# Patient Record
Sex: MALE | Race: WHITE
[De-identification: names, ages, dates, MRNs, and addresses within clinical notes are randomized per-mention and may not be internally consistent; named-entity substitution may affect disease eponyms.]

---

## 2018-04-23 ENCOUNTER — HOSPITAL ENCOUNTER (OUTPATIENT)
Dept: HOSPITAL 74 - FECT | Age: 58
Discharge: HOME | End: 2018-04-23
Payer: COMMERCIAL

## 2018-04-23 DIAGNOSIS — F33.2: Primary | ICD-10-CM

## 2018-04-23 PROCEDURE — GZB4ZZZ OTHER ELECTROCONVULSIVE THERAPY: ICD-10-PCS | Performed by: PSYCHIATRY & NEUROLOGY

## 2018-04-25 ENCOUNTER — HOSPITAL ENCOUNTER (OUTPATIENT)
Dept: HOSPITAL 74 - FECT | Age: 58
Discharge: HOME | End: 2018-04-25
Attending: PSYCHIATRY & NEUROLOGY
Payer: COMMERCIAL

## 2018-04-25 VITALS — DIASTOLIC BLOOD PRESSURE: 68 MMHG | HEART RATE: 68 BPM | SYSTOLIC BLOOD PRESSURE: 108 MMHG

## 2018-04-25 VITALS — TEMPERATURE: 97.6 F

## 2018-04-25 VITALS — BODY MASS INDEX: 29.2 KG/M2

## 2018-04-25 DIAGNOSIS — F33.2: Primary | ICD-10-CM

## 2018-04-25 PROCEDURE — GZB4ZZZ OTHER ELECTROCONVULSIVE THERAPY: ICD-10-PCS | Performed by: PSYCHIATRY & NEUROLOGY

## 2018-04-27 ENCOUNTER — HOSPITAL ENCOUNTER (OUTPATIENT)
Dept: HOSPITAL 74 - FECT | Age: 58
Discharge: HOME | End: 2018-04-27
Payer: COMMERCIAL

## 2018-04-27 DIAGNOSIS — F33.2: Primary | ICD-10-CM

## 2018-04-27 PROCEDURE — GZB4ZZZ OTHER ELECTROCONVULSIVE THERAPY: ICD-10-PCS | Performed by: PSYCHIATRY & NEUROLOGY

## 2018-04-30 ENCOUNTER — HOSPITAL ENCOUNTER (OUTPATIENT)
Dept: HOSPITAL 74 - FECT | Age: 58
Discharge: HOME | End: 2018-04-30
Attending: PSYCHIATRY & NEUROLOGY
Payer: COMMERCIAL

## 2018-04-30 VITALS — HEART RATE: 64 BPM | DIASTOLIC BLOOD PRESSURE: 64 MMHG | SYSTOLIC BLOOD PRESSURE: 110 MMHG

## 2018-04-30 VITALS — BODY MASS INDEX: 28.7 KG/M2

## 2018-04-30 VITALS — TEMPERATURE: 98 F

## 2018-04-30 DIAGNOSIS — F33.2: Primary | ICD-10-CM

## 2018-04-30 PROCEDURE — GZB4ZZZ OTHER ELECTROCONVULSIVE THERAPY: ICD-10-PCS | Performed by: PSYCHIATRY & NEUROLOGY

## 2018-05-02 ENCOUNTER — HOSPITAL ENCOUNTER (OUTPATIENT)
Dept: HOSPITAL 74 - FECT | Age: 58
Discharge: HOME | End: 2018-05-02
Attending: PSYCHIATRY & NEUROLOGY
Payer: COMMERCIAL

## 2018-05-02 VITALS — HEART RATE: 69 BPM | SYSTOLIC BLOOD PRESSURE: 100 MMHG | DIASTOLIC BLOOD PRESSURE: 61 MMHG

## 2018-05-02 VITALS — BODY MASS INDEX: 29 KG/M2

## 2018-05-02 VITALS — TEMPERATURE: 97.7 F

## 2018-05-02 DIAGNOSIS — F33.2: Primary | ICD-10-CM

## 2018-05-02 PROCEDURE — GZB4ZZZ OTHER ELECTROCONVULSIVE THERAPY: ICD-10-PCS | Performed by: PSYCHIATRY & NEUROLOGY

## 2018-05-04 ENCOUNTER — HOSPITAL ENCOUNTER (OUTPATIENT)
Dept: HOSPITAL 74 - FECT | Age: 58
Discharge: HOME | End: 2018-05-04
Attending: PSYCHIATRY & NEUROLOGY
Payer: COMMERCIAL

## 2018-05-04 VITALS — HEART RATE: 64 BPM

## 2018-05-04 VITALS — DIASTOLIC BLOOD PRESSURE: 61 MMHG | SYSTOLIC BLOOD PRESSURE: 100 MMHG

## 2018-05-04 VITALS — TEMPERATURE: 97.9 F

## 2018-05-04 VITALS — BODY MASS INDEX: 28.8 KG/M2

## 2018-05-04 DIAGNOSIS — F33.2: Primary | ICD-10-CM

## 2018-05-04 PROCEDURE — GZB4ZZZ OTHER ELECTROCONVULSIVE THERAPY: ICD-10-PCS | Performed by: PSYCHIATRY & NEUROLOGY

## 2018-05-07 ENCOUNTER — HOSPITAL ENCOUNTER (OUTPATIENT)
Dept: HOSPITAL 74 - FECT | Age: 58
Discharge: HOME | End: 2018-05-07
Attending: PSYCHIATRY & NEUROLOGY
Payer: COMMERCIAL

## 2018-05-07 VITALS — BODY MASS INDEX: 28.8 KG/M2

## 2018-05-07 VITALS — DIASTOLIC BLOOD PRESSURE: 70 MMHG | SYSTOLIC BLOOD PRESSURE: 99 MMHG | HEART RATE: 67 BPM

## 2018-05-07 VITALS — TEMPERATURE: 97.9 F

## 2018-05-07 DIAGNOSIS — F33.2: Primary | ICD-10-CM

## 2018-05-07 PROCEDURE — GZB4ZZZ OTHER ELECTROCONVULSIVE THERAPY: ICD-10-PCS | Performed by: PSYCHIATRY & NEUROLOGY

## 2018-05-09 ENCOUNTER — HOSPITAL ENCOUNTER (OUTPATIENT)
Dept: HOSPITAL 74 - FECT | Age: 58
Discharge: HOME | End: 2018-05-09
Attending: PSYCHIATRY & NEUROLOGY
Payer: COMMERCIAL

## 2018-05-09 VITALS — TEMPERATURE: 97.8 F

## 2018-05-09 VITALS — DIASTOLIC BLOOD PRESSURE: 64 MMHG | HEART RATE: 63 BPM | SYSTOLIC BLOOD PRESSURE: 110 MMHG

## 2018-05-09 VITALS — BODY MASS INDEX: 28.9 KG/M2

## 2018-05-09 DIAGNOSIS — F33.2: Primary | ICD-10-CM

## 2018-05-09 PROCEDURE — GZB4ZZZ OTHER ELECTROCONVULSIVE THERAPY: ICD-10-PCS | Performed by: PSYCHIATRY & NEUROLOGY

## 2018-05-11 ENCOUNTER — HOSPITAL ENCOUNTER (OUTPATIENT)
Dept: HOSPITAL 74 - FECT | Age: 58
Discharge: HOME | End: 2018-05-11
Attending: PSYCHIATRY & NEUROLOGY
Payer: COMMERCIAL

## 2018-05-11 VITALS — TEMPERATURE: 97.7 F

## 2018-05-11 VITALS — SYSTOLIC BLOOD PRESSURE: 114 MMHG | DIASTOLIC BLOOD PRESSURE: 68 MMHG

## 2018-05-11 VITALS — BODY MASS INDEX: 28.8 KG/M2

## 2018-05-11 VITALS — HEART RATE: 53 BPM

## 2018-05-11 DIAGNOSIS — F33.2: Primary | ICD-10-CM

## 2018-05-11 PROCEDURE — GZB4ZZZ OTHER ELECTROCONVULSIVE THERAPY: ICD-10-PCS | Performed by: PSYCHIATRY & NEUROLOGY

## 2018-05-14 ENCOUNTER — HOSPITAL ENCOUNTER (OUTPATIENT)
Dept: HOSPITAL 74 - FECT | Age: 58
Discharge: HOME | End: 2018-05-14
Attending: PSYCHIATRY & NEUROLOGY
Payer: COMMERCIAL

## 2018-05-14 VITALS — DIASTOLIC BLOOD PRESSURE: 64 MMHG | SYSTOLIC BLOOD PRESSURE: 104 MMHG

## 2018-05-14 VITALS — BODY MASS INDEX: 28.7 KG/M2

## 2018-05-14 VITALS — TEMPERATURE: 97.7 F | HEART RATE: 68 BPM

## 2018-05-14 DIAGNOSIS — F33.2: Primary | ICD-10-CM

## 2018-05-14 PROCEDURE — GZB0ZZZ ELECTROCONVULSIVE THERAPY, UNILATERAL-SINGLE SEIZURE: ICD-10-PCS | Performed by: PSYCHIATRY & NEUROLOGY

## 2018-05-16 ENCOUNTER — HOSPITAL ENCOUNTER (OUTPATIENT)
Dept: HOSPITAL 74 - FECT | Age: 58
Discharge: HOME | End: 2018-05-16
Attending: PSYCHIATRY & NEUROLOGY
Payer: COMMERCIAL

## 2018-05-16 VITALS — TEMPERATURE: 97.6 F

## 2018-05-16 VITALS — SYSTOLIC BLOOD PRESSURE: 104 MMHG | HEART RATE: 72 BPM | DIASTOLIC BLOOD PRESSURE: 67 MMHG

## 2018-05-16 VITALS — BODY MASS INDEX: 28.8 KG/M2

## 2018-05-16 DIAGNOSIS — F33.2: Primary | ICD-10-CM

## 2018-05-16 PROCEDURE — GZB4ZZZ OTHER ELECTROCONVULSIVE THERAPY: ICD-10-PCS | Performed by: PSYCHIATRY & NEUROLOGY

## 2018-05-18 ENCOUNTER — HOSPITAL ENCOUNTER (OUTPATIENT)
Dept: HOSPITAL 74 - FECT | Age: 58
Discharge: HOME | End: 2018-05-18
Attending: PSYCHIATRY & NEUROLOGY
Payer: COMMERCIAL

## 2018-05-18 VITALS — BODY MASS INDEX: 28.8 KG/M2

## 2018-05-18 VITALS — SYSTOLIC BLOOD PRESSURE: 109 MMHG | DIASTOLIC BLOOD PRESSURE: 70 MMHG | HEART RATE: 61 BPM

## 2018-05-18 VITALS — TEMPERATURE: 97.8 F

## 2018-05-18 DIAGNOSIS — F33.2: Primary | ICD-10-CM

## 2018-05-18 PROCEDURE — GZB4ZZZ OTHER ELECTROCONVULSIVE THERAPY: ICD-10-PCS | Performed by: PSYCHIATRY & NEUROLOGY

## 2018-05-21 ENCOUNTER — HOSPITAL ENCOUNTER (OUTPATIENT)
Dept: HOSPITAL 74 - FECT | Age: 58
Discharge: HOME | End: 2018-05-21
Attending: PSYCHIATRY & NEUROLOGY
Payer: COMMERCIAL

## 2018-05-21 VITALS — SYSTOLIC BLOOD PRESSURE: 119 MMHG | DIASTOLIC BLOOD PRESSURE: 74 MMHG | HEART RATE: 57 BPM

## 2018-05-21 VITALS — TEMPERATURE: 97.5 F

## 2018-05-21 VITALS — BODY MASS INDEX: 28.7 KG/M2

## 2018-05-21 DIAGNOSIS — F33.2: Primary | ICD-10-CM

## 2018-05-21 PROCEDURE — GZB4ZZZ OTHER ELECTROCONVULSIVE THERAPY: ICD-10-PCS | Performed by: PSYCHIATRY & NEUROLOGY

## 2018-05-23 ENCOUNTER — HOSPITAL ENCOUNTER (OUTPATIENT)
Dept: HOSPITAL 74 - FECT | Age: 58
Discharge: HOME | End: 2018-05-23
Attending: PSYCHIATRY & NEUROLOGY
Payer: COMMERCIAL

## 2018-05-23 VITALS — TEMPERATURE: 98 F | HEART RATE: 72 BPM | SYSTOLIC BLOOD PRESSURE: 104 MMHG | DIASTOLIC BLOOD PRESSURE: 81 MMHG

## 2018-05-23 DIAGNOSIS — F33.2: Primary | ICD-10-CM

## 2018-05-23 PROCEDURE — GZB4ZZZ OTHER ELECTROCONVULSIVE THERAPY: ICD-10-PCS | Performed by: PSYCHIATRY & NEUROLOGY

## 2018-05-23 NOTE — HP
Admitting History and Physical





- Admission


History of Present Illness: 





patient is a 57-year-old male, with a past medical history of depression and 

anxiety. Patient presents for ECT, this will be his 14th ECT his last ECT was 05 /21/2018. Patient reports minimal improvement in depressive symptoms starting 

ECT Effexor was added to patient's medication regimen today. Patient denies any 

recent illnesses or hospitalizations. Patient denies any suicidal or homicidal 

ideation or visual or auditory hallucinations. Patient Does report compliance 

with prescribed medications.








History Source: Patient


Limitations to Obtaining History: No Limitations





- Smoking History


Smoking history: Never smoked


Have you smoked in the past 12 months: No





- Alcohol/Substance Use


Hx Alcohol Use: No


History of Substance Use: reports: None





- Social History


Usual Living Arrangement: Yes: With Spouse


ADL: Independent


History of Recent Travel: No





Home Medications





- Allergies


Allergies/Adverse Reactions: 


 Allergies











Allergy/AdvReac Type Severity Reaction Status Date / Time


 


Penicillins Allergy Intermediate Hives Verified 05/11/18 06:20














- Home Medications


Home Medications: 


Ambulatory Orders





Divalproex [Depakote -] 250 mg PO DAILY 04/23/18 


Divalproex [Depakote -] 500 mg PO HS 04/23/18 


Escitalopram Oxalate [Lexapro -] 10 mg PO DAILY 04/23/18 


Lurasidone HCl [Latuda] 60 mg PO DAILY 04/23/18 











Family Disease History





- Family Disease History


Family Disease History: Diabetes: Father, Mother, Son, Heart Disease: Mother, 

Other: Father, Sister, Son





Review of Systems





- Review of Systems


Constitutional: reports: No Symptoms


Eyes: reports: No Symptoms


HENT: reports: No Symptoms


Neck: reports: No Symptoms


Cardiovascular: reports: No Symptoms


Respiratory: reports: No Symptoms


Gastrointestinal: reports: No Symptoms


Genitourinary: reports: No Symptoms


Musculoskeletal: reports: No Symptoms


Integumentary: reports: No Symptoms


Neurological: reports: No Symptoms


Endocrine: reports: No Symptoms


Hematology/Lymphatic: reports: No Symptoms


Psychiatric: reports: Anxiety, Depression





Physical Examination


Vital Signs: 


 Vital Signs











Temperature  98 F   05/23/18 08:42


 


Pulse Rate  72   05/23/18 08:42


 


Respiratory Rate  16   05/23/18 08:42


 


Blood Pressure  104/81   05/23/18 08:42


 


O2 Sat by Pulse Oximetry (%)  94 L  05/23/18 08:42











Constitutional: Yes: Well Nourished, No Distress, Calm


Eyes: Yes: WNL, Conjunctiva Clear, EOM Intact


HENT: Yes: WNL, Atraumatic, Normocephalic


Neck: Yes: WNL, Supple, Trachea Midline


Cardiovascular: Yes: WNL, Regular Rate and Rhythm, S1, S2


Respiratory: Yes: WNL, Regular, CTA Bilaterally


Gastrointestinal: Yes: WNL, Normal Bowel Sounds, Soft


...Rectal Exam: Yes: Deferred


Renal/: Yes: WNL


Breast(s): Yes: WNL


Musculoskeletal: Yes: WNL


Extremities: Yes: WNL


Edema: No


Peripheral Pulses WNL: Yes


Peripheral Pulses: Left Radial: 4+, Right Radial: 4+, Left Doralis Pedis: 3+, 

Right Dorsalis Pedis: 3+, Left Femoral: 3+, Right Femoral: 3+


Integumentary: Yes: WNL


Neurological: Yes: WNL, Alert, Oriented, Cran Nerves II-XII Intact


...Motor Strength: WNL


Psychiatric: Yes: WNL, Alert, Oriented


Labs: 





Reviewed 04/23/2018.





Imaging





- Results


EKG: Image Reviewed, Other (sinus bradycardia left axis deviation)





Assessment/Plan





patient is a 49-year-old male that presents for ECT labs and EKG reviewed.


patient is medically optimized for procedure.


informed consent risk and benefits to be obtained by Dr. Rod

## 2018-05-25 ENCOUNTER — HOSPITAL ENCOUNTER (OUTPATIENT)
Dept: HOSPITAL 74 - FECT | Age: 58
Discharge: HOME | End: 2018-05-25
Attending: PSYCHIATRY & NEUROLOGY
Payer: COMMERCIAL

## 2018-05-25 VITALS — TEMPERATURE: 98.6 F | HEART RATE: 58 BPM

## 2018-05-25 VITALS — BODY MASS INDEX: 28.5 KG/M2

## 2018-05-25 VITALS — DIASTOLIC BLOOD PRESSURE: 67 MMHG | SYSTOLIC BLOOD PRESSURE: 103 MMHG

## 2018-05-25 DIAGNOSIS — F33.2: Primary | ICD-10-CM

## 2018-05-25 PROCEDURE — GZB4ZZZ OTHER ELECTROCONVULSIVE THERAPY: ICD-10-PCS | Performed by: PSYCHIATRY & NEUROLOGY

## 2018-05-30 ENCOUNTER — HOSPITAL ENCOUNTER (OUTPATIENT)
Dept: HOSPITAL 74 - FECT | Age: 58
Discharge: HOME | End: 2018-05-30
Attending: PSYCHIATRY & NEUROLOGY
Payer: COMMERCIAL

## 2018-05-30 VITALS — BODY MASS INDEX: 28.5 KG/M2

## 2018-05-30 VITALS — SYSTOLIC BLOOD PRESSURE: 112 MMHG | HEART RATE: 66 BPM | DIASTOLIC BLOOD PRESSURE: 72 MMHG

## 2018-05-30 VITALS — TEMPERATURE: 97.7 F

## 2018-05-30 DIAGNOSIS — F33.2: Primary | ICD-10-CM

## 2018-05-30 PROCEDURE — GZB4ZZZ OTHER ELECTROCONVULSIVE THERAPY: ICD-10-PCS | Performed by: PSYCHIATRY & NEUROLOGY

## 2018-06-01 ENCOUNTER — HOSPITAL ENCOUNTER (OUTPATIENT)
Dept: HOSPITAL 74 - FECT | Age: 58
Discharge: HOME | End: 2018-06-01
Attending: PSYCHIATRY & NEUROLOGY
Payer: COMMERCIAL

## 2018-06-01 VITALS — BODY MASS INDEX: 28.6 KG/M2

## 2018-06-01 VITALS — TEMPERATURE: 97.5 F

## 2018-06-01 VITALS — DIASTOLIC BLOOD PRESSURE: 66 MMHG | HEART RATE: 54 BPM | SYSTOLIC BLOOD PRESSURE: 126 MMHG

## 2018-06-01 DIAGNOSIS — F33.2: Primary | ICD-10-CM

## 2018-06-01 PROCEDURE — GZB4ZZZ OTHER ELECTROCONVULSIVE THERAPY: ICD-10-PCS | Performed by: PSYCHIATRY & NEUROLOGY

## 2018-06-04 ENCOUNTER — HOSPITAL ENCOUNTER (OUTPATIENT)
Dept: HOSPITAL 74 - FECT | Age: 58
Discharge: HOME | End: 2018-06-04
Attending: PSYCHIATRY & NEUROLOGY
Payer: COMMERCIAL

## 2018-06-04 VITALS — BODY MASS INDEX: 28.5 KG/M2

## 2018-06-04 VITALS — TEMPERATURE: 98.2 F

## 2018-06-04 VITALS — DIASTOLIC BLOOD PRESSURE: 66 MMHG | HEART RATE: 72 BPM | SYSTOLIC BLOOD PRESSURE: 106 MMHG

## 2018-06-04 DIAGNOSIS — F33.2: Primary | ICD-10-CM

## 2018-06-04 PROCEDURE — GZB4ZZZ OTHER ELECTROCONVULSIVE THERAPY: ICD-10-PCS | Performed by: PSYCHIATRY & NEUROLOGY

## 2018-06-06 ENCOUNTER — HOSPITAL ENCOUNTER (OUTPATIENT)
Dept: HOSPITAL 74 - FECT | Age: 58
Discharge: HOME | End: 2018-06-06
Attending: PSYCHIATRY & NEUROLOGY
Payer: COMMERCIAL

## 2018-06-06 VITALS — DIASTOLIC BLOOD PRESSURE: 76 MMHG | TEMPERATURE: 98.5 F | SYSTOLIC BLOOD PRESSURE: 120 MMHG | HEART RATE: 66 BPM

## 2018-06-06 VITALS — BODY MASS INDEX: 28.3 KG/M2

## 2018-06-06 DIAGNOSIS — F33.2: Primary | ICD-10-CM

## 2018-06-06 PROCEDURE — GZB4ZZZ OTHER ELECTROCONVULSIVE THERAPY: ICD-10-PCS | Performed by: PSYCHIATRY & NEUROLOGY

## 2018-06-20 ENCOUNTER — HOSPITAL ENCOUNTER (OUTPATIENT)
Dept: HOSPITAL 74 - FECT | Age: 58
Discharge: HOME | End: 2018-06-20
Attending: PSYCHIATRY & NEUROLOGY
Payer: COMMERCIAL

## 2018-06-20 VITALS — SYSTOLIC BLOOD PRESSURE: 117 MMHG | HEART RATE: 62 BPM | DIASTOLIC BLOOD PRESSURE: 72 MMHG

## 2018-06-20 VITALS — TEMPERATURE: 97.3 F

## 2018-06-20 VITALS — BODY MASS INDEX: 28.8 KG/M2

## 2018-06-20 DIAGNOSIS — F33.2: Primary | ICD-10-CM

## 2018-06-20 PROCEDURE — GZB4ZZZ OTHER ELECTROCONVULSIVE THERAPY: ICD-10-PCS | Performed by: PSYCHIATRY & NEUROLOGY

## 2018-06-22 ENCOUNTER — HOSPITAL ENCOUNTER (OUTPATIENT)
Dept: HOSPITAL 74 - FECT | Age: 58
Discharge: HOME | End: 2018-06-22
Attending: PSYCHIATRY & NEUROLOGY
Payer: COMMERCIAL

## 2018-06-22 VITALS — TEMPERATURE: 97.6 F

## 2018-06-22 VITALS — SYSTOLIC BLOOD PRESSURE: 114 MMHG | HEART RATE: 58 BPM | DIASTOLIC BLOOD PRESSURE: 72 MMHG

## 2018-06-22 VITALS — BODY MASS INDEX: 28.9 KG/M2

## 2018-06-22 DIAGNOSIS — F33.2: Primary | ICD-10-CM

## 2018-06-22 PROCEDURE — GZB4ZZZ OTHER ELECTROCONVULSIVE THERAPY: ICD-10-PCS | Performed by: PSYCHIATRY & NEUROLOGY

## 2018-06-25 ENCOUNTER — HOSPITAL ENCOUNTER (OUTPATIENT)
Dept: HOSPITAL 74 - FECT | Age: 58
Discharge: HOME | End: 2018-06-25
Attending: PSYCHIATRY & NEUROLOGY
Payer: COMMERCIAL

## 2018-06-25 VITALS — TEMPERATURE: 98.2 F

## 2018-06-25 VITALS — BODY MASS INDEX: 29.7 KG/M2

## 2018-06-25 VITALS — DIASTOLIC BLOOD PRESSURE: 72 MMHG | SYSTOLIC BLOOD PRESSURE: 118 MMHG | HEART RATE: 64 BPM

## 2018-06-25 DIAGNOSIS — F33.2: Primary | ICD-10-CM

## 2018-06-25 PROCEDURE — GZB4ZZZ OTHER ELECTROCONVULSIVE THERAPY: ICD-10-PCS | Performed by: PSYCHIATRY & NEUROLOGY

## 2018-06-25 NOTE — HP
Admitting History and Physical





- Admission


History of Present Illness: 





Patient is a 58 y/o male with a past medical history of depression and anxiety. 

patient presents for ect, his last ect was 6/22/18.  Patient reports 

significant improvement in depressive symptoms since starting ect.  He reports 

an improvement in his appetite and energy levels since starting ect.  He denies 

any recent illnesses or hospitalizations. He denies any suicidal or homicidal 

ideation, visual or auditory hallucinations.  


History Source: Patient, Caregiver


Limitations to Obtaining History: No Limitations





- Smoking History


Smoking history: Never smoked


Have you smoked in the past 12 months: No





- Alcohol/Substance Use


Hx Alcohol Use: No


History of Substance Use: reports: None





- Social History


Usual Living Arrangement: Yes: With Spouse


ADL: Independent


History of Recent Travel: No





Home Medications





- Allergies


Allergies/Adverse Reactions: 


 Allergies











Allergy/AdvReac Type Severity Reaction Status Date / Time


 


Penicillins Allergy Intermediate Hives Verified 06/22/18 11:59














- Home Medications


Home Medications: 


Ambulatory Orders





Divalproex [Depakote -] 250 mg PO DAILY 04/23/18 


Divalproex [Depakote -] 500 mg PO HS 04/23/18 


Venlafaxine HCl ER [Effexor Xr -] 150 mg PO DAILY 05/25/18 


Olanzapine [Zyprexa -] 10 mg PO HS 06/20/18 











Family Disease History





- Family Disease History


Family Disease History: Diabetes: Father, Mother, Son, Heart Disease: Mother, 

Other: Father, Sister, Son





Review of Systems





- Review of Systems


Constitutional: reports: No Symptoms


Eyes: reports: No Symptoms


HENT: reports: No Symptoms


Neck: reports: No Symptoms


Cardiovascular: reports: No Symptoms


Respiratory: reports: No Symptoms


Gastrointestinal: reports: No Symptoms


Genitourinary: reports: No Symptoms


Musculoskeletal: reports: No Symptoms


Integumentary: reports: No Symptoms


Neurological: reports: No Symptoms


Endocrine: reports: No Symptoms


Hematology/Lymphatic: reports: No Symptoms


Psychiatric: reports: No Symptoms





Physical Examination


Vital Signs: 


 Vital Signs











Temperature  98.2 F   06/25/18 07:45


 


Pulse Rate  66   06/25/18 07:45


 


Respiratory Rate  18   06/25/18 07:45


 


Blood Pressure  115/70   06/25/18 07:45


 


O2 Sat by Pulse Oximetry (%)  98   06/25/18 07:45











Constitutional: Yes: Well Nourished, No Distress, Calm


Eyes: Yes: WNL, Conjunctiva Clear, EOM Intact


HENT: Yes: WNL, Atraumatic, Normocephalic


Neck: Yes: WNL, Supple, Trachea Midline


Cardiovascular: Yes: WNL, Regular Rate and Rhythm, S1, S2


Respiratory: Yes: WNL, Regular, CTA Bilaterally


Gastrointestinal: Yes: WNL, Normal Bowel Sounds, Soft


...Rectal Exam: Yes: Deferred


Renal/: Yes: WNL


Musculoskeletal: Yes: WNL


Extremities: Yes: WNL


Edema: No


Peripheral Pulses WNL: Yes


Integumentary: Yes: WNL


Neurological: Yes: WNL, Alert, Oriented


...Motor Strength: WNL


Psychiatric: Yes: WNL, Alert, Oriented


Labs: 





reviewed 4/18





Imaging





- Results


EKG: Other (sinus bradycardia left axis deviation)





Assessment/Plan





patient is a 58 y/o male that presents for ect, labs and ekg reviewed


patent is medically optimized for procedure


informed consent, risks/benefits to be obtained by Dr Rod.

## 2018-06-27 ENCOUNTER — HOSPITAL ENCOUNTER (OUTPATIENT)
Dept: HOSPITAL 74 - FECT | Age: 58
Discharge: HOME | End: 2018-06-27
Attending: PSYCHIATRY & NEUROLOGY
Payer: COMMERCIAL

## 2018-06-27 VITALS — BODY MASS INDEX: 30.1 KG/M2

## 2018-06-27 VITALS — DIASTOLIC BLOOD PRESSURE: 83 MMHG | SYSTOLIC BLOOD PRESSURE: 121 MMHG | HEART RATE: 74 BPM

## 2018-06-27 VITALS — TEMPERATURE: 97.7 F

## 2018-06-27 DIAGNOSIS — F33.2: Primary | ICD-10-CM

## 2018-06-27 PROCEDURE — GZB4ZZZ OTHER ELECTROCONVULSIVE THERAPY: ICD-10-PCS | Performed by: PSYCHIATRY & NEUROLOGY

## 2018-07-06 ENCOUNTER — HOSPITAL ENCOUNTER (OUTPATIENT)
Dept: HOSPITAL 74 - FECT | Age: 58
Discharge: HOME | End: 2018-07-06
Attending: PSYCHIATRY & NEUROLOGY
Payer: COMMERCIAL

## 2018-07-06 VITALS — BODY MASS INDEX: 29.5 KG/M2

## 2018-07-06 VITALS — SYSTOLIC BLOOD PRESSURE: 110 MMHG | DIASTOLIC BLOOD PRESSURE: 71 MMHG | HEART RATE: 67 BPM

## 2018-07-06 VITALS — TEMPERATURE: 97.7 F

## 2018-07-06 DIAGNOSIS — F33.2: Primary | ICD-10-CM

## 2018-07-06 PROCEDURE — GZB4ZZZ OTHER ELECTROCONVULSIVE THERAPY: ICD-10-PCS | Performed by: PSYCHIATRY & NEUROLOGY

## 2018-07-13 ENCOUNTER — HOSPITAL ENCOUNTER (OUTPATIENT)
Dept: HOSPITAL 74 - FECT | Age: 58
Discharge: HOME | End: 2018-07-13
Attending: PSYCHIATRY & NEUROLOGY
Payer: COMMERCIAL

## 2018-07-13 VITALS — DIASTOLIC BLOOD PRESSURE: 78 MMHG | HEART RATE: 72 BPM | SYSTOLIC BLOOD PRESSURE: 114 MMHG

## 2018-07-13 VITALS — TEMPERATURE: 98.2 F

## 2018-07-13 VITALS — BODY MASS INDEX: 30.1 KG/M2

## 2018-07-13 DIAGNOSIS — F33.2: Primary | ICD-10-CM

## 2018-07-13 PROCEDURE — GZB4ZZZ OTHER ELECTROCONVULSIVE THERAPY: ICD-10-PCS | Performed by: PSYCHIATRY & NEUROLOGY

## 2018-07-20 ENCOUNTER — HOSPITAL ENCOUNTER (OUTPATIENT)
Dept: HOSPITAL 74 - FECT | Age: 58
Discharge: HOME | End: 2018-07-20
Attending: PSYCHIATRY & NEUROLOGY
Payer: COMMERCIAL

## 2018-07-20 VITALS — DIASTOLIC BLOOD PRESSURE: 81 MMHG | SYSTOLIC BLOOD PRESSURE: 125 MMHG | HEART RATE: 62 BPM

## 2018-07-20 VITALS — TEMPERATURE: 97.6 F

## 2018-07-20 VITALS — BODY MASS INDEX: 30.1 KG/M2

## 2018-07-20 DIAGNOSIS — F33.2: Primary | ICD-10-CM

## 2018-07-20 PROCEDURE — GZB4ZZZ OTHER ELECTROCONVULSIVE THERAPY: ICD-10-PCS | Performed by: PSYCHIATRY & NEUROLOGY

## 2018-07-23 ENCOUNTER — HOSPITAL ENCOUNTER (OUTPATIENT)
Dept: HOSPITAL 74 - FECT | Age: 58
Discharge: HOME | End: 2018-07-23
Attending: PSYCHIATRY & NEUROLOGY
Payer: COMMERCIAL

## 2018-07-23 VITALS — HEART RATE: 60 BPM | DIASTOLIC BLOOD PRESSURE: 83 MMHG | SYSTOLIC BLOOD PRESSURE: 133 MMHG

## 2018-07-23 VITALS — BODY MASS INDEX: 30.1 KG/M2

## 2018-07-23 VITALS — TEMPERATURE: 98.1 F

## 2018-07-23 DIAGNOSIS — F33.2: Primary | ICD-10-CM

## 2018-07-23 PROCEDURE — GZB4ZZZ OTHER ELECTROCONVULSIVE THERAPY: ICD-10-PCS | Performed by: PSYCHIATRY & NEUROLOGY

## 2018-07-26 ENCOUNTER — HOSPITAL ENCOUNTER (OUTPATIENT)
Dept: HOSPITAL 74 - FECT | Age: 58
Discharge: HOME | End: 2018-07-26
Attending: PSYCHIATRY & NEUROLOGY
Payer: COMMERCIAL

## 2018-07-26 VITALS — BODY MASS INDEX: 30.1 KG/M2

## 2018-07-26 VITALS — HEART RATE: 69 BPM | DIASTOLIC BLOOD PRESSURE: 79 MMHG | SYSTOLIC BLOOD PRESSURE: 123 MMHG

## 2018-07-26 VITALS — TEMPERATURE: 97.9 F

## 2018-07-26 DIAGNOSIS — F33.2: Primary | ICD-10-CM

## 2018-07-26 PROCEDURE — GZB4ZZZ OTHER ELECTROCONVULSIVE THERAPY: ICD-10-PCS | Performed by: PSYCHIATRY & NEUROLOGY

## 2018-07-26 NOTE — HP
Admitting History and Physical





- Admission


History of Present Illness: 





Patient is a 56 y/o male with a past medical history of depression and anxiety.

  Patient present for ect, he has been undergoing ect since 4/2018, his last 

ect was7/23/18.  Patient reports feeling well, he reports a slight improvement 

in depressive symptoms since starting ECT.  Patient reports his depakote was 

discontinued and reports feeling well since discontinuing the medications. 

patient denies any suicdal or homicidal ideation, visual or auditory 

hallucinations.  


History Source: Patient


Limitations to Obtaining History: No Limitations





- Smoking History


Smoking history: Never smoked


Have you smoked in the past 12 months: No





- Alcohol/Substance Use


Hx Alcohol Use: No


History of Substance Use: reports: None





- Social History


Usual Living Arrangement: Yes: With Spouse


ADL: Independent


History of Recent Travel: No





Home Medications





- Allergies


Allergies/Adverse Reactions: 


 Allergies











Allergy/AdvReac Type Severity Reaction Status Date / Time


 


Penicillins Allergy Intermediate Hives Verified 07/26/18 06:52














- Home Medications


Home Medications: 


Ambulatory Orders





Venlafaxine HCl ER [Effexor Xr -] 150 mg PO DAILY 05/25/18 


Olanzapine [Zyprexa -] 10 mg PO HS 06/20/18 











Family Disease History





- Family Disease History


Family Disease History: Diabetes: Father, Mother, Son, Heart Disease: Mother, 

Other: Father, Sister, Son





Review of Systems





- Review of Systems


Constitutional: reports: No Symptoms


Eyes: reports: No Symptoms


HENT: reports: No Symptoms


Neck: reports: No Symptoms


Cardiovascular: reports: No Symptoms


Respiratory: reports: No Symptoms


Gastrointestinal: reports: No Symptoms


Genitourinary: reports: No Symptoms


Musculoskeletal: reports: No Symptoms


Integumentary: reports: No Symptoms


Neurological: reports: No Symptoms


Endocrine: reports: No Symptoms


Hematology/Lymphatic: reports: No Symptoms


Psychiatric: reports: Depression





Physical Examination


Vital Signs: 


 Vital Signs











Temperature  97.9 F   07/26/18 06:43


 


Pulse Rate  56 L  07/26/18 06:43


 


Respiratory Rate  16   07/26/18 06:43


 


Blood Pressure  136/75   07/26/18 06:43


 


O2 Sat by Pulse Oximetry (%)  97   07/26/18 06:43











Constitutional: Yes: Well Nourished, No Distress, Calm


Eyes: Yes: WNL, Conjunctiva Clear, EOM Intact


HENT: Yes: WNL, Atraumatic, Normocephalic


Neck: Yes: WNL, Supple, Trachea Midline


Cardiovascular: Yes: WNL, Regular Rate and Rhythm, S1, S2


Respiratory: Yes: WNL, Regular, CTA Bilaterally


Gastrointestinal: Yes: WNL, Normal Bowel Sounds, Soft


...Rectal Exam: Yes: Deferred


Renal/: Yes: WNL


Musculoskeletal: Yes: WNL


Extremities: Yes: WNL


Edema: No


Peripheral Pulses WNL: Yes


Peripheral Pulses: Left Radial: 4+, Right Radial: 4+, Left Doralis Pedis: 3+, 

Right Dorsalis Pedis: 3+, Left Femoral: 3+, Right Femoral: 3+


Integumentary: Yes: WNL


Neurological: Yes: WNL, Alert, Oriented


...Motor Strength: WNL


Psychiatric: Yes: WNL, Alert, Oriented


Labs: 





reviewed 7/18





Imaging





- Results


EKG: Image Reviewed, Other (nsr)





Assessment/Plan





patient is a 56 y/o male that presents for ect, labs and ekg reviewed


patient is medically optimized for procedure


informed consent, risks/benefits to be obtained by Dr Rod

## 2018-07-30 ENCOUNTER — HOSPITAL ENCOUNTER (OUTPATIENT)
Dept: HOSPITAL 74 - FECT | Age: 58
Discharge: HOME | End: 2018-07-30
Attending: PSYCHIATRY & NEUROLOGY
Payer: COMMERCIAL

## 2018-07-30 VITALS — DIASTOLIC BLOOD PRESSURE: 77 MMHG | HEART RATE: 68 BPM | SYSTOLIC BLOOD PRESSURE: 120 MMHG

## 2018-07-30 VITALS — BODY MASS INDEX: 30.1 KG/M2

## 2018-07-30 VITALS — TEMPERATURE: 98 F

## 2018-07-30 DIAGNOSIS — F33.2: Primary | ICD-10-CM

## 2018-07-30 PROCEDURE — GZB4ZZZ OTHER ELECTROCONVULSIVE THERAPY: ICD-10-PCS | Performed by: PSYCHIATRY & NEUROLOGY

## 2018-08-06 ENCOUNTER — HOSPITAL ENCOUNTER (OUTPATIENT)
Dept: HOSPITAL 74 - FECT | Age: 58
Discharge: HOME | End: 2018-08-06
Attending: PSYCHIATRY & NEUROLOGY
Payer: COMMERCIAL

## 2018-08-06 VITALS — TEMPERATURE: 98.1 F

## 2018-08-06 VITALS — BODY MASS INDEX: 29.5 KG/M2

## 2018-08-06 VITALS — SYSTOLIC BLOOD PRESSURE: 136 MMHG | HEART RATE: 61 BPM | DIASTOLIC BLOOD PRESSURE: 80 MMHG

## 2018-08-06 DIAGNOSIS — F33.2: Primary | ICD-10-CM

## 2018-08-06 PROCEDURE — GZB4ZZZ OTHER ELECTROCONVULSIVE THERAPY: ICD-10-PCS | Performed by: PSYCHIATRY & NEUROLOGY

## 2018-08-09 ENCOUNTER — HOSPITAL ENCOUNTER (OUTPATIENT)
Dept: HOSPITAL 74 - FECT | Age: 58
Discharge: HOME | End: 2018-08-09
Attending: PSYCHIATRY & NEUROLOGY
Payer: COMMERCIAL

## 2018-08-09 VITALS — BODY MASS INDEX: 29.5 KG/M2

## 2018-08-09 VITALS — SYSTOLIC BLOOD PRESSURE: 122 MMHG | DIASTOLIC BLOOD PRESSURE: 85 MMHG | HEART RATE: 81 BPM

## 2018-08-09 VITALS — TEMPERATURE: 98.2 F

## 2018-08-09 DIAGNOSIS — F33.2: Primary | ICD-10-CM

## 2018-08-09 PROCEDURE — GZB4ZZZ OTHER ELECTROCONVULSIVE THERAPY: ICD-10-PCS | Performed by: PSYCHIATRY & NEUROLOGY

## 2018-08-23 ENCOUNTER — HOSPITAL ENCOUNTER (OUTPATIENT)
Dept: HOSPITAL 74 - FECT | Age: 58
Discharge: HOME | End: 2018-08-23
Attending: PSYCHIATRY & NEUROLOGY
Payer: COMMERCIAL

## 2018-08-23 VITALS — HEART RATE: 88 BPM | SYSTOLIC BLOOD PRESSURE: 122 MMHG | DIASTOLIC BLOOD PRESSURE: 78 MMHG

## 2018-08-23 VITALS — TEMPERATURE: 97.6 F

## 2018-08-23 VITALS — BODY MASS INDEX: 29.1 KG/M2

## 2018-08-23 DIAGNOSIS — F33.2: Primary | ICD-10-CM

## 2018-08-23 PROCEDURE — GZB4ZZZ OTHER ELECTROCONVULSIVE THERAPY: ICD-10-PCS | Performed by: PSYCHIATRY & NEUROLOGY

## 2018-08-30 ENCOUNTER — HOSPITAL ENCOUNTER (OUTPATIENT)
Dept: HOSPITAL 74 - FECT | Age: 58
Discharge: HOME | End: 2018-08-30
Attending: PSYCHIATRY & NEUROLOGY
Payer: COMMERCIAL

## 2018-08-30 VITALS — TEMPERATURE: 98 F

## 2018-08-30 VITALS — DIASTOLIC BLOOD PRESSURE: 74 MMHG | SYSTOLIC BLOOD PRESSURE: 122 MMHG | HEART RATE: 66 BPM

## 2018-08-30 VITALS — BODY MASS INDEX: 30.4 KG/M2

## 2018-08-30 DIAGNOSIS — F33.2: Primary | ICD-10-CM

## 2018-08-30 PROCEDURE — GZB4ZZZ OTHER ELECTROCONVULSIVE THERAPY: ICD-10-PCS | Performed by: PSYCHIATRY & NEUROLOGY

## 2018-08-30 NOTE — HP
Admitting History and Physical





- Admission


History of Present Illness: 





Patient is a 58 y/o male with a past medical history of depression and anxiety.

  Patient presents for ect, his last ect was 8/23/18.  Patient reports feeling 

well, he denies any recent illnesses or hospitalizations. Patient reports a 

slight improvement in depressive symptoms since starting ect. He does report 

ongoing feelings of anxiety.  Patient reports starting buspar 2 weeks ago and 

reports tolerating medication.  Patient denies any suicidal or homicidal 

ideation, visual or auditory hallucinations. 


History Source: Patient


Limitations to Obtaining History: No Limitations





- Smoking History


Smoking history: Never smoked


Have you smoked in the past 12 months: No





- Alcohol/Substance Use


Hx Alcohol Use: No


History of Substance Use: reports: None





- Social History


Usual Living Arrangement: Yes: With Spouse


ADL: Independent


History of Recent Travel: No





Home Medications





- Allergies


Allergies/Adverse Reactions: 


 Allergies











Allergy/AdvReac Type Severity Reaction Status Date / Time


 


Penicillins Allergy Intermediate Hives Verified 08/20/18 06:03














- Home Medications


Home Medications: 


Ambulatory Orders





Venlafaxine HCl ER [Effexor Xr -] 150 mg PO DAILY 05/25/18 


Olanzapine [Zyprexa -] 10 mg PO HS 06/20/18 


Benztropine Mesylate 1 mg PO DAILY 08/20/18 


Buspirone HCl [Buspar -] 10 mg PO BID 08/23/18 











Family Disease History





- Family Disease History


Family Disease History: Diabetes: Father, Mother, Son, Heart Disease: Mother, 

Other: Father, Sister, Son





Review of Systems





- Review of Systems


Constitutional: reports: No Symptoms


Eyes: reports: No Symptoms


HENT: reports: No Symptoms


Neck: reports: No Symptoms


Cardiovascular: reports: No Symptoms


Respiratory: reports: No Symptoms


Gastrointestinal: reports: No Symptoms


Genitourinary: reports: No Symptoms


Musculoskeletal: reports: No Symptoms


Integumentary: reports: No Symptoms


Neurological: reports: No Symptoms


Endocrine: reports: No Symptoms


Hematology/Lymphatic: reports: No Symptoms


Psychiatric: reports: No Symptoms





Physical Examination


Vital Signs: 


 Vital Signs











Temperature  98.0 F   08/30/18 06:30


 


Pulse Rate  68   08/30/18 06:30


 


Respiratory Rate  18   08/30/18 06:30


 


Blood Pressure  110/76   08/30/18 06:30


 


O2 Sat by Pulse Oximetry (%)  97   08/30/18 06:30











Constitutional: Yes: Well Nourished, No Distress, Calm


Eyes: Yes: WNL, Conjunctiva Clear, EOM Intact


HENT: Yes: WNL, Atraumatic, Normocephalic


Neck: Yes: WNL, Supple, Trachea Midline


Cardiovascular: Yes: WNL, Regular Rate and Rhythm, S1, S2


Respiratory: Yes: WNL, Regular, CTA Bilaterally


Gastrointestinal: Yes: WNL, Normal Bowel Sounds, Soft


...Rectal Exam: Yes: Deferred


Renal/: Yes: WNL


Musculoskeletal: Yes: WNL


Extremities: Yes: WNL


Edema: No


Peripheral Pulses WNL: Yes


Peripheral Pulses: Left Radial: 4+, Right Radial: 4+, Left Doralis Pedis: 3+, 

Right Dorsalis Pedis: 3+, Left Femoral: 3+, Right Femoral: 3+


Integumentary: Yes: WNL


Neurological: Yes: WNL, Alert, Oriented


...Motor Strength: WNL


Psychiatric: Yes: WNL, Alert, Oriented


Labs: 





reviewed 4/18





Imaging





- Results


EKG: Other (sinus bradycardia)





Assessment/Plan





patient is 58 y/o male that presents for ect, labs and ekg reviewed


patient is medically optimized for procedure


informed consent, risk/benefits to be obtained by Dr Rod

## 2018-09-13 ENCOUNTER — HOSPITAL ENCOUNTER (OUTPATIENT)
Dept: HOSPITAL 74 - FECT | Age: 58
Discharge: HOME | End: 2018-09-13
Attending: PSYCHIATRY & NEUROLOGY
Payer: COMMERCIAL

## 2018-09-13 VITALS — BODY MASS INDEX: 30.6 KG/M2

## 2018-09-13 VITALS — SYSTOLIC BLOOD PRESSURE: 119 MMHG | HEART RATE: 72 BPM | DIASTOLIC BLOOD PRESSURE: 72 MMHG

## 2018-09-13 VITALS — TEMPERATURE: 97.8 F

## 2018-09-13 DIAGNOSIS — F33.2: Primary | ICD-10-CM

## 2018-09-13 PROCEDURE — GZB4ZZZ OTHER ELECTROCONVULSIVE THERAPY: ICD-10-PCS | Performed by: PSYCHIATRY & NEUROLOGY

## 2018-09-21 ENCOUNTER — HOSPITAL ENCOUNTER (OUTPATIENT)
Dept: HOSPITAL 74 - FECT | Age: 58
Discharge: HOME | End: 2018-09-21
Attending: PSYCHIATRY & NEUROLOGY
Payer: COMMERCIAL

## 2018-09-21 VITALS — BODY MASS INDEX: 30.6 KG/M2

## 2018-09-21 VITALS — SYSTOLIC BLOOD PRESSURE: 144 MMHG | DIASTOLIC BLOOD PRESSURE: 91 MMHG

## 2018-09-21 VITALS — TEMPERATURE: 98 F | HEART RATE: 58 BPM

## 2018-09-21 DIAGNOSIS — F33.2: Primary | ICD-10-CM

## 2018-09-21 PROCEDURE — GZB4ZZZ OTHER ELECTROCONVULSIVE THERAPY: ICD-10-PCS | Performed by: PSYCHIATRY & NEUROLOGY
